# Patient Record
(demographics unavailable — no encounter records)

---

## 2025-03-10 NOTE — PAST MEDICAL HISTORY
[Menarche Age ____] : age at menarche was [unfilled] [Total Preg ___] : G[unfilled] [Live Births ___] : P[unfilled]  [Age At Live Birth ___] : Age at live birth: [unfilled] [FreeTextEntry8] : 8 mo to present

## 2025-03-10 NOTE — ASSESSMENT
[FreeTextEntry1] : Ms. Rowley is a 31 year old woman with a right breast nodule on ultrasound. Her breast exam today is without suspicious findings. The right breast funny sensations can potentially be related to hormone changes, and have been decreasing. The right breast nodule is stable to slightly decreased for the past 2 years. Another 1-year right US was recommended by the Radiologist and is ordered. If no further follow-up is needed, she can follow up on an as needed basis. We discussed that annul screening mammography is to begin at age 40. She understands and is comfortable with the plan. She is encouraged to call if any questions or concerns arise.

## 2025-03-10 NOTE — DATA REVIEWED
[FreeTextEntry1] : I have independently reviewed the reports and the images.   R US 4/25/24 - 1.9 cm nodule in R breast 10:00 N7, stable [from 4/15/22] - BIRADS 2; 1 yr US

## 2025-03-10 NOTE — HISTORY OF PRESENT ILLNESS
[FreeTextEntry1] : Ms. Rowley is a 31 year old woman who presents for a follow-up for a right breast nodule on US. She has no breast symptoms. In December, she had some funny sensations in the right breast but that has decreased. Her PMS symptoms have also been worse recently. No lumps.  Her family history is not significant for any breast cancer.